# Patient Record
Sex: FEMALE | Race: BLACK OR AFRICAN AMERICAN | Employment: UNEMPLOYED | URBAN - METROPOLITAN AREA
[De-identification: names, ages, dates, MRNs, and addresses within clinical notes are randomized per-mention and may not be internally consistent; named-entity substitution may affect disease eponyms.]

---

## 2022-09-22 ENCOUNTER — HOSPITAL ENCOUNTER (EMERGENCY)
Age: 37
Discharge: LEFT BEFORE DISCHARGE | End: 2022-09-22
Attending: EMERGENCY MEDICINE
Payer: MEDICAID

## 2022-09-22 VITALS
SYSTOLIC BLOOD PRESSURE: 119 MMHG | HEART RATE: 82 BPM | BODY MASS INDEX: 25.19 KG/M2 | RESPIRATION RATE: 17 BRPM | TEMPERATURE: 98.2 F | DIASTOLIC BLOOD PRESSURE: 104 MMHG | OXYGEN SATURATION: 99 % | WEIGHT: 166.23 LBS | HEIGHT: 68 IN

## 2022-09-22 DIAGNOSIS — Z76.5 DRUG-SEEKING BEHAVIOR: ICD-10-CM

## 2022-09-22 DIAGNOSIS — J06.9 VIRAL URI WITH COUGH: Primary | ICD-10-CM

## 2022-09-22 PROCEDURE — 99282 EMERGENCY DEPT VISIT SF MDM: CPT

## 2022-09-22 RX ORDER — FLUTICASONE PROPIONATE AND SALMETEROL 250; 50 UG/1; UG/1
POWDER RESPIRATORY (INHALATION)
COMMUNITY
Start: 2022-09-08

## 2022-09-22 RX ORDER — FLUTICASONE PROPIONATE 50 MCG
SPRAY, SUSPENSION (ML) NASAL
COMMUNITY
Start: 2022-09-08

## 2022-09-22 RX ORDER — PREDNISONE 10 MG/1
10 TABLET ORAL DAILY
COMMUNITY

## 2022-09-22 RX ORDER — MONTELUKAST SODIUM 10 MG/1
10 TABLET ORAL DAILY
COMMUNITY
Start: 2022-09-08

## 2022-09-22 NOTE — ED NOTES
Ambulated to nurse's station, stated that she needed to give her wife her car keys, asked if she could walk out to [facility]  Pt seen ambulating to waiting room  Then seen by security cameras getting into vehicle and exiting parking lot in vehicle

## 2022-09-22 NOTE — ED TRIAGE NOTES
Ambulated to room 3 without difficulties  States that she is here for \"an emergent refill on my Promethazine DM medicine. \" States that she is visiting from [place] and her physician is not allowed to call medication into a pharmacy out of state  Denies any complaints

## 2022-09-22 NOTE — ED NOTES
When performing med rec, noted that pt had multiple prescriptions for phenergan-dm, last filled within a couple days  Pt informed this RN she has not had \"any in a long while. \"

## 2022-09-22 NOTE — ED PROVIDER NOTES
80-year-old female who presents to the ER with a past medical history significant for asthma for evaluation for shortness of breath with dry cough and congestion and wanting her medication refill of Phenergan with codeine. She states that her medication was just refilled 2 months ago by her doctor who is in the ER. She denies any fever chills, nausea or vomiting, abdominal pain, diarrhea, constipation, dysuria, sick contact, skin rash or recent travel. Past Medical History:   Diagnosis Date    Asthma        History reviewed. No pertinent surgical history. History reviewed. No pertinent family history. Social History     Socioeconomic History    Marital status: SINGLE     Spouse name: Not on file    Number of children: Not on file    Years of education: Not on file    Highest education level: Not on file   Occupational History    Not on file   Tobacco Use    Smoking status: Never    Smokeless tobacco: Not on file   Substance and Sexual Activity    Alcohol use: Never    Drug use: Yes     Types: Marijuana    Sexual activity: Not on file   Other Topics Concern    Not on file   Social History Narrative    Not on file     Social Determinants of Health     Financial Resource Strain: Not on file   Food Insecurity: Not on file   Transportation Needs: Not on file   Physical Activity: Not on file   Stress: Not on file   Social Connections: Not on file   Intimate Partner Violence: Not on file   Housing Stability: Not on file         ALLERGIES: Penicillins    Review of Systems   All other systems reviewed and are negative. Vitals:    09/22/22 0411   BP: (!) 119/104   Pulse: 82   Resp: 17   Temp: 98.2 °F (36.8 °C)   SpO2: 99%   Weight: 75.4 kg (166 lb 3.6 oz)   Height: 5' 8\" (1.727 m)            Physical Exam  Vitals and nursing note reviewed. Exam conducted with a chaperone present.       CONSTITUTIONAL: Well-appearing; well-nourished; in no apparent distress  HEAD: Normocephalic; atraumatic  EYES: PERRL; EOM intact; conjunctiva and sclera are clear bilaterally. ENT: No rhinorrhea; normal pharynx with no tonsillar hypertrophy; mucous membranes pink/moist, no erythema, no exudate. NECK: Supple; non-tender; no cervical lymphadenopathy  CARD: Normal S1, S2; no murmurs, rubs, or gallops. Regular rate and rhythm. RESP: Normal respiratory effort; breath sounds clear and equal bilaterally; no wheezes, rhonchi, or rales. ABD: Normal bowel sounds; non-distended; non-tender; no palpable organomegaly, no masses, no bruits. Back Exam: Normal inspection; no vertebral point tenderness, no CVA tenderness. Normal range of motion. EXT: Normal ROM in all four extremities; non-tender to palpation; no swelling or deformity; distal pulses are normal, no edema. SKIN: Warm; dry; no rash. NEURO:Alert and oriented x 3, coherent, DUY-XII grossly intact, sensory and motor are non-focal.        MDM  Number of Diagnoses or Management Options  Drug-seeking behavior  Viral URI with cough  Diagnosis management comments: Assessment: 72-year-old female who presents to the ER for evaluation for medication refill of her Phenergan with codeine that she alleges she was prescribed 2 months ago by her physician in [place]. Upon further review of the medical record, the patient has had multiple prescriptions of Phenergan DM/Phenergan with codeine refill of the [facility] including in the [facility]    Plan: Education, reassurance, symptomatic treatment/over-the-counter remedy/ Monitor and Reevaluate.          Amount and/or Complexity of Data Reviewed  Clinical lab tests: ordered and reviewed  Tests in the radiology section of CPT®: ordered and reviewed  Tests in the medicine section of CPT®: reviewed and ordered  Discussion of test results with the performing providers: yes  Decide to obtain previous medical records or to obtain history from someone other than the patient: yes  Obtain history from someone other than the patient: yes  Review and summarize past medical records: yes  Discuss the patient with other providers: yes  Independent visualization of images, tracings, or specimens: yes    Risk of Complications, Morbidity, and/or Mortality  Presenting problems: low  Diagnostic procedures: low  Management options: low           Procedures    Progress Note:   Pt has been reexamined by Colleen Miller MD. Pt was told that she would not be given any further Phenergan with codeine or DM and instructed take over-the-counter medication. The patient agreed to do so then decided to leave the emergency room and eloped without getting his discharge instruction. Written by Colleen Miller MD,4:32 AM    .   .